# Patient Record
Sex: FEMALE | Race: BLACK OR AFRICAN AMERICAN | Employment: OTHER | URBAN - METROPOLITAN AREA
[De-identification: names, ages, dates, MRNs, and addresses within clinical notes are randomized per-mention and may not be internally consistent; named-entity substitution may affect disease eponyms.]

---

## 2018-02-15 ENCOUNTER — OFFICE VISIT (OUTPATIENT)
Dept: PHYSICAL MEDICINE AND REHAB | Age: 73
End: 2018-02-15

## 2018-02-15 DIAGNOSIS — M79.605 PAIN IN BOTH LOWER EXTREMITIES: ICD-10-CM

## 2018-02-15 DIAGNOSIS — M54.17 LUMBOSACRAL RADICULOPATHY AT S1: Primary | ICD-10-CM

## 2018-02-15 DIAGNOSIS — R20.2 PARESTHESIA OF BILATERAL LEGS: ICD-10-CM

## 2018-02-15 DIAGNOSIS — M79.604 PAIN IN BOTH LOWER EXTREMITIES: ICD-10-CM

## 2018-02-15 PROCEDURE — 95910 NRV CNDJ TEST 7-8 STUDIES: CPT | Performed by: PHYSICAL MEDICINE & REHABILITATION

## 2018-02-15 PROCEDURE — 95886 MUSC TEST DONE W/N TEST COMP: CPT | Performed by: PHYSICAL MEDICINE & REHABILITATION

## 2018-02-15 NOTE — PROGRESS NOTES
Gastroc Normal None \" \" \" \"   EDB Increased + Dec #, Larger \" \" \"   1st dors int \" \" Polys \": \" Polys       FINDINGS:   EMG of the lumbar paraspinals and the LE's demonstrated significant paraspinal and limb muscle membrane irritability. Positive waves and fibrillations with neuropathic motor units were identified in the L>R S1 myotomes. The tibial H reflexes were missing. Otherwise, sensory and motor latencies, evoked amplitudes and conduction velocities were normal.      IMPRESSION:      1. Abnormal EMG. There is distinct electrical evidence of active (acute) spinal nerve root injury (acute L>R S1 radiculopathies). The electrical changes noted today certainly reflect more than \"post op changes\" that can be present for years after spinal surgery. 2. No evidence of a concurrent lumbar plexopathy, peripheral neuropathy or primary muscle disease.           Thank you for this interesting referral.

## 2021-09-20 ENCOUNTER — HOSPITAL ENCOUNTER (OUTPATIENT)
Age: 76
Discharge: HOME | End: 2021-09-20
Payer: MEDICARE

## 2021-09-20 LAB
ANION GAP SERPL CALCULATED.3IONS-SCNC: 11 MMOL/L (ref 4–16)
BUN BLDV-MCNC: 32 MG/DL (ref 6–23)
CALCIUM SERPL-MCNC: 9.3 MG/DL (ref 8.3–10.6)
CHLORIDE BLD-SCNC: 102 MMOL/L (ref 99–110)
CO2: 31 MMOL/L (ref 21–32)
CREAT SERPL-MCNC: 1 MG/DL (ref 0.6–1.1)
GFR AFRICAN AMERICAN: >60 ML/MIN/1.73M2
GFR NON-AFRICAN AMERICAN: 54 ML/MIN/1.73M2
GLUCOSE BLD-MCNC: 91 MG/DL (ref 70–99)
POTASSIUM SERPL-SCNC: 3.8 MMOL/L (ref 3.5–5.1)
SODIUM BLD-SCNC: 144 MMOL/L (ref 135–145)

## 2021-09-20 PROCEDURE — 36415 COLL VENOUS BLD VENIPUNCTURE: CPT

## 2021-09-20 PROCEDURE — 80048 BASIC METABOLIC PNL TOTAL CA: CPT

## 2021-11-08 ENCOUNTER — HOSPITAL ENCOUNTER (OUTPATIENT)
Dept: LAB | Age: 76
Discharge: HOME | End: 2021-11-08
Payer: MEDICARE

## 2021-11-08 LAB
ANION GAP SERPL CALCULATED.3IONS-SCNC: 11 MMOL/L (ref 4–16)
APTT: 33.3 SECONDS (ref 25.1–37.1)
BASOPHILS ABSOLUTE: 0 K/CU MM
BASOPHILS RELATIVE PERCENT: 0.4 % (ref 0–1)
BUN BLDV-MCNC: 30 MG/DL (ref 6–23)
CALCIUM SERPL-MCNC: 9.7 MG/DL (ref 8.3–10.6)
CHLORIDE BLD-SCNC: 102 MMOL/L (ref 99–110)
CO2: 30 MMOL/L (ref 21–32)
CREAT SERPL-MCNC: 1.3 MG/DL (ref 0.6–1.1)
DIFFERENTIAL TYPE: ABNORMAL
EOSINOPHILS ABSOLUTE: 0.1 K/CU MM
EOSINOPHILS RELATIVE PERCENT: 0.5 % (ref 0–3)
GFR AFRICAN AMERICAN: 48 ML/MIN/1.73M2
GFR NON-AFRICAN AMERICAN: 40 ML/MIN/1.73M2
GLUCOSE BLD-MCNC: 93 MG/DL (ref 70–99)
HCT VFR BLD CALC: 42.3 % (ref 37–47)
HEMOGLOBIN: 13.5 GM/DL (ref 12.5–16)
IMMATURE NEUTROPHIL %: 0.4 % (ref 0–0.43)
INR BLD: 1.08 INDEX
LYMPHOCYTES ABSOLUTE: 2.5 K/CU MM
LYMPHOCYTES RELATIVE PERCENT: 21.7 % (ref 24–44)
MCH RBC QN AUTO: 31.6 PG (ref 27–31)
MCHC RBC AUTO-ENTMCNC: 31.9 % (ref 32–36)
MCV RBC AUTO: 99.1 FL (ref 78–100)
MONOCYTES ABSOLUTE: 0.6 K/CU MM
MONOCYTES RELATIVE PERCENT: 5.3 % (ref 0–4)
NUCLEATED RBC %: 0 %
PDW BLD-RTO: 12.7 % (ref 11.7–14.9)
PLATELET # BLD: 355 K/CU MM (ref 140–440)
PMV BLD AUTO: 10.1 FL (ref 7.5–11.1)
POTASSIUM SERPL-SCNC: 3.6 MMOL/L (ref 3.5–5.1)
PROTHROMBIN TIME: 14 SECONDS (ref 11.7–14.5)
RBC # BLD: 4.27 M/CU MM (ref 4.2–5.4)
SEGMENTED NEUTROPHILS ABSOLUTE COUNT: 8.2 K/CU MM
SEGMENTED NEUTROPHILS RELATIVE PERCENT: 71.7 % (ref 36–66)
SODIUM BLD-SCNC: 143 MMOL/L (ref 135–145)
TOTAL IMMATURE NEUTOROPHIL: 0.05 K/CU MM
TOTAL NUCLEATED RBC: 0 K/CU MM
WBC # BLD: 11.4 K/CU MM (ref 4–10.5)

## 2021-11-08 PROCEDURE — 36415 COLL VENOUS BLD VENIPUNCTURE: CPT

## 2021-11-08 PROCEDURE — U0003 INFECTIOUS AGENT DETECTION BY NUCLEIC ACID (DNA OR RNA); SEVERE ACUTE RESPIRATORY SYNDROME CORONAVIRUS 2 (SARS-COV-2) (CORONAVIRUS DISEASE [COVID-19]), AMPLIFIED PROBE TECHNIQUE, MAKING USE OF HIGH THROUGHPUT TECHNOLOGIES AS DESCRIBED BY CMS-2020-01-R: HCPCS

## 2021-11-08 PROCEDURE — U0005 INFEC AGEN DETEC AMPLI PROBE: HCPCS

## 2021-11-08 PROCEDURE — 85730 THROMBOPLASTIN TIME PARTIAL: CPT

## 2021-11-08 PROCEDURE — 80048 BASIC METABOLIC PNL TOTAL CA: CPT

## 2021-11-08 PROCEDURE — 85610 PROTHROMBIN TIME: CPT

## 2021-11-08 PROCEDURE — 85025 COMPLETE CBC W/AUTO DIFF WBC: CPT

## 2021-11-09 LAB
SARS-COV-2: NOT DETECTED
SOURCE: NORMAL

## 2021-11-12 ENCOUNTER — OFFICE VISIT (OUTPATIENT)
Dept: PHYSICAL MEDICINE AND REHAB | Age: 76
End: 2021-11-12
Payer: MEDICARE

## 2021-11-12 VITALS — TEMPERATURE: 97.3 F

## 2021-11-12 DIAGNOSIS — G56.03 BILATERAL CARPAL TUNNEL SYNDROME: Primary | ICD-10-CM

## 2021-11-12 DIAGNOSIS — M54.12 C7 RADICULOPATHY: ICD-10-CM

## 2021-11-12 DIAGNOSIS — M79.602 PARESTHESIA AND PAIN OF BOTH UPPER EXTREMITIES: ICD-10-CM

## 2021-11-12 DIAGNOSIS — M79.601 PARESTHESIA AND PAIN OF BOTH UPPER EXTREMITIES: ICD-10-CM

## 2021-11-12 DIAGNOSIS — G56.21 ULNAR NEUROPATHY AT ELBOW, RIGHT: ICD-10-CM

## 2021-11-12 DIAGNOSIS — R20.2 PARESTHESIA AND PAIN OF BOTH UPPER EXTREMITIES: ICD-10-CM

## 2021-11-12 PROCEDURE — 95886 MUSC TEST DONE W/N TEST COMP: CPT | Performed by: PHYSICAL MEDICINE & REHABILITATION

## 2021-11-12 PROCEDURE — 95911 NRV CNDJ TEST 9-10 STUDIES: CPT | Performed by: PHYSICAL MEDICINE & REHABILITATION

## 2021-11-12 NOTE — PROGRESS NOTES
EMG REPORT     CHIEF COMPLAINT: Right arm and hand pain with digit tingling. HISTORY OF PRESENT ILLNESS: 64 y.o. right hand dominant female with progressive numbness, tingling and pain involving the right more than left arms and hands for several months. She has had chronic spinal and limb pains for years. In fact, she has significant leg pain and tingling today. She reports mild neck pain with stiffness but limited radiation of the pain towards her shoulder. She has numbness and tingling in the digits of the right more than left hand. At times it is only her pinky finger. At other times all 5 digits tingle and burn. She rated the pain severity as 5/10. The symptoms do interrupt her sleep. She has not been dropping things. She did not report upper limb cramping, rashes or focal swelling. She has no history of diabetes or any thyroid disorder. PHYSICAL EXAMINATION: Alert. About 60 degrees of active cervical spine rotation bilaterally. Spurling's maneuver was negative. Upper limb reflexes were trace and symmetric. Tinel's sign was negative. Thumb opposition MMT was 4/5 on the right and 4+/5 on the left. Right elbow extension strength was 4 -/5. All other strength seemed normal.  There was no atrophy, tremor or clonus present. Sensation was distorted in the thumb and all fingers of the right hand. NERVE CONDUCTION STUDIES:     MOTOR         LATENCY NORMAL AMPLITUDE DISTANCE COND. DURAN. RIGHT  MEDIAN 5.9 < 4.2 msec 3.2 8 cm 47   LEFT  MEDIAN 5.9 < 4.2 msec 5.2 8 cm 49   RIGHT  ULNAR 2.9 < 4.2 msec 6.4/4.4 8 cm 61/32   LEFT  ULNAR 3.1 < 4.2 msec 48 8 cm >50      SENSORY  ORTHODROMIC        LATENCY NORMAL AMPLITUDE DISTANCE   RIGHT MEDIAN 3.7 <2.3 msec 13 10 cm   LEFT  MEDIAN 3.7 < 2.3 msec 7 10 cm   RIGHT  ULNAR 2.1 < 2.3 msec 7 10 cm   LEFT  ULNAR 2.2 < 2.3 msec 8 10 cm       Right dorsal ulnar sensory: Absent.         NEEDLE EMG:      RIGHT   LEFT     Insertional Activity Spontaneous  Activity Volutional  MUAP's Insertional Activity Spontaneous  Activity Volutional  MUAP's   Cerv Parasp Guarding None Normal Guarding None Normal   Infraspinatus Normal None Normal Normal None Normal   Deltoid   Normal None Normal Normal None Normal   Biceps   Normal None Normal Normal None Normal   Triceps   Normal None Dec #, Larger Normal None Normal   Pronator Teres Normal None Dec #, Larger Normal None Normal   Extensor Indicis Normal None Dec #, Larger Normal None Normal   1st Dorsal Interosseus Normal None Dec #, Larger, Polys Normal None Normal   ADM Normal None Dec #, Larger, Polys Normal None Normal   Abductor Pollicis Br. Increased ++ Dec #, Larger, Polys Normal None Dec #, Polys         FINDINGS:    EMG of the cervical paraspinals and both upper limbs demonstrated muscle guarding of the paraspinal region. Prevalent muscle membrane irritability was limited to the right APB muscle. A diminished number of larger and polyphasic motor units were seen in the right hand intrinsic muscles. Diminished number of larger motor units were seen throughout the right C7 myotome. Some motor unit recruitment compromise was seen in the left APB muscle. Median sensory and motor latencies were delayed across each wrist.  The right median motor evoked amplitude was diminished. The right median motor forearm conduction velocity was slowed. Ulnar sensory responses were normal but the right dorsal ulnar sensory response was missing. There was significant right ulnar motor conduction slowing with the evoked amplitude loss with stimulation around the elbow segment. The left ulnar nerve conductions were well maintained. IMPRESSION:      1.  Multiple abnormalities were noted today. 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      2.  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 3.  Remote/past right C7 spinal nerve root injury (remote/past right C7 radiculopathy). 4.  No evidence of a concurrent active radiculopathy, plexopathy, generalized neuropathy or primary muscle disease.        Thank you for this interesting referral.

## 2021-11-15 ENCOUNTER — APPOINTMENT (OUTPATIENT)
Dept: CT IMAGING | Age: 76
End: 2021-11-15
Attending: INTERNAL MEDICINE
Payer: MEDICARE

## 2021-11-15 ENCOUNTER — HOSPITAL ENCOUNTER (OUTPATIENT)
Dept: CARDIAC CATH/INVASIVE PROCEDURES | Age: 76
Discharge: HOME | End: 2021-11-15
Attending: INTERNAL MEDICINE | Admitting: INTERNAL MEDICINE
Payer: MEDICARE

## 2021-11-15 VITALS
HEIGHT: 63 IN | SYSTOLIC BLOOD PRESSURE: 113 MMHG | WEIGHT: 195 LBS | TEMPERATURE: 96.2 F | HEART RATE: 59 BPM | OXYGEN SATURATION: 100 % | DIASTOLIC BLOOD PRESSURE: 68 MMHG | BODY MASS INDEX: 34.55 KG/M2 | RESPIRATION RATE: 17 BRPM

## 2021-11-15 LAB
ACTIVATED CLOTTING TIME, LOW RANGE: 195 SEC
ALBUMIN SERPL-MCNC: 3.4 GM/DL (ref 3.4–5)
ALP BLD-CCNC: 44 IU/L (ref 40–129)
ALT SERPL-CCNC: 16 U/L (ref 10–40)
ANION GAP SERPL CALCULATED.3IONS-SCNC: 10 MMOL/L (ref 4–16)
AST SERPL-CCNC: 14 IU/L (ref 15–37)
BASE EXCESS MIXED: 3.4 (ref 0–2.3)
BILIRUB SERPL-MCNC: 0.5 MG/DL (ref 0–1)
BILIRUBIN DIRECT: 0.2 MG/DL (ref 0–0.3)
BILIRUBIN, INDIRECT: 0.3 MG/DL (ref 0–0.7)
BUN BLDV-MCNC: 30 MG/DL (ref 6–23)
CALCIUM SERPL-MCNC: 9.3 MG/DL (ref 8.3–10.6)
CARBON MONOXIDE, BLOOD: 1.9 % (ref 0–5)
CHLORIDE BLD-SCNC: 101 MMOL/L (ref 99–110)
CHOLESTEROL: 168 MG/DL
CO2 CONTENT: 29.9 MMOL/L (ref 19–24)
CO2: 25 MMOL/L (ref 21–32)
COMMENT: ABNORMAL
CREAT SERPL-MCNC: 1.4 MG/DL (ref 0.6–1.1)
D DIMER: <200 NG/ML(DDU)
GFR AFRICAN AMERICAN: 44 ML/MIN/1.73M2
GFR NON-AFRICAN AMERICAN: 36 ML/MIN/1.73M2
GLUCOSE BLD-MCNC: 108 MG/DL (ref 70–99)
HCO3 ARTERIAL: 28.5 MMOL/L (ref 18–23)
HDLC SERPL-MCNC: 62 MG/DL
LDL CHOLESTEROL DIRECT: 106 MG/DL
METHEMOGLOBIN ARTERIAL: 1.2 %
O2 SATURATION: 92.5 % (ref 96–97)
PCO2 ARTERIAL: 44 MMHG (ref 32–45)
PH BLOOD: 7.42 (ref 7.34–7.45)
PO2 ARTERIAL: 63 MMHG (ref 75–100)
POTASSIUM SERPL-SCNC: 3.7 MMOL/L (ref 3.5–5.1)
SODIUM BLD-SCNC: 136 MMOL/L (ref 135–145)
T4 FREE: 0.93 NG/DL (ref 0.9–1.8)
TOTAL PROTEIN: 5.5 GM/DL (ref 6.4–8.2)
TRIGL SERPL-MCNC: 59 MG/DL
TSH HIGH SENSITIVITY: 1.95 UIU/ML (ref 0.27–4.2)

## 2021-11-15 PROCEDURE — 84443 ASSAY THYROID STIM HORMONE: CPT

## 2021-11-15 PROCEDURE — 83721 ASSAY OF BLOOD LIPOPROTEIN: CPT

## 2021-11-15 PROCEDURE — 80061 LIPID PANEL: CPT

## 2021-11-15 PROCEDURE — 6360000002 HC RX W HCPCS

## 2021-11-15 PROCEDURE — 71250 CT THORAX DX C-: CPT

## 2021-11-15 PROCEDURE — 84439 ASSAY OF FREE THYROXINE: CPT

## 2021-11-15 PROCEDURE — 2500000003 HC RX 250 WO HCPCS

## 2021-11-15 PROCEDURE — 2580000003 HC RX 258: Performed by: INTERNAL MEDICINE

## 2021-11-15 PROCEDURE — C1894 INTRO/SHEATH, NON-LASER: HCPCS

## 2021-11-15 PROCEDURE — 2709999900 HC NON-CHARGEABLE SUPPLY

## 2021-11-15 PROCEDURE — 80076 HEPATIC FUNCTION PANEL: CPT

## 2021-11-15 PROCEDURE — 80048 BASIC METABOLIC PNL TOTAL CA: CPT

## 2021-11-15 PROCEDURE — C1769 GUIDE WIRE: HCPCS

## 2021-11-15 PROCEDURE — 6370000000 HC RX 637 (ALT 250 FOR IP): Performed by: INTERNAL MEDICINE

## 2021-11-15 PROCEDURE — 85347 COAGULATION TIME ACTIVATED: CPT

## 2021-11-15 PROCEDURE — C1887 CATHETER, GUIDING: HCPCS

## 2021-11-15 PROCEDURE — 6360000004 HC RX CONTRAST MEDICATION

## 2021-11-15 PROCEDURE — 93458 L HRT ARTERY/VENTRICLE ANGIO: CPT

## 2021-11-15 PROCEDURE — 85379 FIBRIN DEGRADATION QUANT: CPT

## 2021-11-15 PROCEDURE — 82803 BLOOD GASES ANY COMBINATION: CPT

## 2021-11-15 RX ORDER — SODIUM CHLORIDE 9 MG/ML
INJECTION, SOLUTION INTRAVENOUS CONTINUOUS
Status: DISCONTINUED | OUTPATIENT
Start: 2021-11-15 | End: 2021-11-15 | Stop reason: HOSPADM

## 2021-11-15 RX ORDER — IBUPROFEN 200 MG
1 CAPSULE ORAL DAILY
COMMUNITY
End: 2021-12-14

## 2021-11-15 RX ORDER — POTASSIUM CHLORIDE 750 MG/1
10 CAPSULE, EXTENDED RELEASE ORAL DAILY
COMMUNITY

## 2021-11-15 RX ORDER — SODIUM CHLORIDE 9 MG/ML
25 INJECTION, SOLUTION INTRAVENOUS PRN
Status: DISCONTINUED | OUTPATIENT
Start: 2021-11-15 | End: 2021-11-15 | Stop reason: HOSPADM

## 2021-11-15 RX ORDER — DIAZEPAM 5 MG/1
5 TABLET ORAL ONCE
Status: COMPLETED | OUTPATIENT
Start: 2021-11-15 | End: 2021-11-15

## 2021-11-15 RX ORDER — SOY PROTEIN
POWDER (GRAM) ORAL DAILY
COMMUNITY
End: 2021-12-14

## 2021-11-15 RX ORDER — SODIUM CHLORIDE 0.9 % (FLUSH) 0.9 %
5-40 SYRINGE (ML) INJECTION EVERY 12 HOURS SCHEDULED
Status: DISCONTINUED | OUTPATIENT
Start: 2021-11-15 | End: 2021-11-15 | Stop reason: HOSPADM

## 2021-11-15 RX ORDER — SODIUM CHLORIDE 0.9 % (FLUSH) 0.9 %
5-40 SYRINGE (ML) INJECTION PRN
Status: DISCONTINUED | OUTPATIENT
Start: 2021-11-15 | End: 2021-11-15 | Stop reason: HOSPADM

## 2021-11-15 RX ORDER — ATROPINE SULFATE 0.1 MG/ML
0.5 INJECTION INTRAVENOUS
Status: DISCONTINUED | OUTPATIENT
Start: 2021-11-15 | End: 2021-11-15 | Stop reason: HOSPADM

## 2021-11-15 RX ORDER — SUMATRIPTAN 25 MG/1
25 TABLET, FILM COATED ORAL
COMMUNITY

## 2021-11-15 RX ORDER — MORPHINE SULFATE 2 MG/ML
1 INJECTION, SOLUTION INTRAMUSCULAR; INTRAVENOUS
Status: DISCONTINUED | OUTPATIENT
Start: 2021-11-15 | End: 2021-11-15 | Stop reason: HOSPADM

## 2021-11-15 RX ORDER — TRAVOPROST OPHTHALMIC SOLUTION 0.04 MG/ML
1 SOLUTION OPHTHALMIC NIGHTLY
COMMUNITY

## 2021-11-15 RX ORDER — ESCITALOPRAM OXALATE 20 MG/1
10 TABLET ORAL DAILY
COMMUNITY

## 2021-11-15 RX ORDER — FLUTICASONE PROPIONATE 50 MCG
1 SPRAY, SUSPENSION (ML) NASAL PRN
COMMUNITY

## 2021-11-15 RX ORDER — OMEPRAZOLE 20 MG/1
20 CAPSULE, DELAYED RELEASE ORAL DAILY
COMMUNITY
End: 2021-12-14

## 2021-11-15 RX ORDER — ATROPINE SULFATE 0.4 MG/ML
0.5 AMPUL (ML) INJECTION
Status: DISCONTINUED | OUTPATIENT
Start: 2021-11-15 | End: 2021-11-15

## 2021-11-15 RX ORDER — DIPHENHYDRAMINE HCL 25 MG
25 TABLET ORAL ONCE
Status: COMPLETED | OUTPATIENT
Start: 2021-11-15 | End: 2021-11-15

## 2021-11-15 RX ORDER — ACETAMINOPHEN 325 MG/1
650 TABLET ORAL EVERY 4 HOURS PRN
Status: DISCONTINUED | OUTPATIENT
Start: 2021-11-15 | End: 2021-11-15 | Stop reason: HOSPADM

## 2021-11-15 RX ORDER — TOPIRAMATE 25 MG/1
25 TABLET ORAL 2 TIMES DAILY
COMMUNITY

## 2021-11-15 RX ORDER — FUROSEMIDE 20 MG/1
20 TABLET ORAL DAILY
COMMUNITY

## 2021-11-15 RX ADMIN — DIPHENHYDRAMINE HCL 25 MG: 25 TABLET ORAL at 08:14

## 2021-11-15 RX ADMIN — SODIUM CHLORIDE: 9 INJECTION, SOLUTION INTRAVENOUS at 08:13

## 2021-11-15 RX ADMIN — DIAZEPAM 5 MG: 5 TABLET ORAL at 08:14

## 2021-11-15 NOTE — OP NOTE
Operative Note      Patient:  Margret Garcia  YOB: 1945  MRN: [id number]    Date of Procedure: 11/15/21    Pre-Op Diagnosis: chest pain     Post-Op Diagnosis: Same       Electronically signed by Rebecca Paige MD on 11/15/2021 at 9:05 AM      DICTATED --[id number]  LEFT MAIN PATENT  LAD MILD DX  LCX MILD DX  RCA MILD DX  LVEDP 15  MEDICAL TREATMENT  CHECK CT CHEST   CHECK LABS  HOME LATER TODAY  RIGHT RADIAL APPROACH    Electronically signed by Rebecca Paige MD on 11/15/21 at 9:11 AM EST

## 2021-11-15 NOTE — PROGRESS NOTES
Discharge instructions reviewed. Questions answered and patient verbalized understanding. PIV removed. Radial Site WNL.  Patient transported to CT scan per order prior to discharge

## 2021-11-15 NOTE — H&P
[address], [address] [place] [address]                              HISTORY AND PHYSICAL    PATIENT NAME: Gregory Gore                     :        1945  MED REC NO:   [id number]                          ROOM:  ACCOUNT NO:   [de-identified]                           ADMIT DATE: 11/15/2021  PROVIDER:     Miguelangel Thibodeaux MD    INDICATION:  Chest pain and shortness of breath. HISTORY OF PRESENT ILLNESS:  This is a 75-year-old female patient,  patient of Dr. Javi Vences. Getting progressive short of breath. Chest pain  with exertion present. Relieved by resting. It is getting worse in the  last six months to one year. She has never had a heart cath. She does have a history of hypertension. She is on medications for  that. Questionable history of having sleep apnea present. PAST MEDICAL HISTORY:  Proximal atrial fibrillation, hypertension,  hyperlipidemia, asthma, depression, bronchitis, migraine headache,  osteoporosis, and vertigo. PAST SURGERIES:  Appendectomy, back surgery, hysterectomy, left knee  surgery, right knee surgery, and tonsillectomy. SOCIAL HISTORY:  She was does not smoke and does not drink. She is a  retired . ALLERGIES:  Her allergies are NKDA. MEDICATIONS:  She is on inhalers, calcium, multivitamins, Diovan,  Eliquis 5 b.i.d., Lasix, Imitrex, Lexapro, meclizine. See the rest of  the list.    PHYSICAL EXAMINATION:  GENERAL:  The patient is awake, alert, answering questions, not in acute  distress. VITAL SIGNS:  Temperature is afebrile, pulse is 75, blood pressure is  194/68. HEENT:  Head is normocephalic and atraumatic. Pupils are equal and  reactive to light. CHEST:  Equal expansion. LUNGS:  Clear to auscultation. No wheezing or rhonchi. HEART:  Regular rhythm. ABDOMEN:  Soft, nontender. Bowel sounds are present. No  hepatosplenomegaly or guarding appreciated.   EXTREMITIES: No cyanosis noted. NEUROLOGIC:  Cranial nerves II through XII are grossly intact. DIAGNOSTIC DATA:  Echo in 11/2020, LV function was preserved. Her  stress test was also negative for ischemia. IMPRESSION:  This is a 75-year-old female patient getting progressive  short of breath and acute bowel ischemia cannot be excluded. PLAN:  In light of her ongoing symptoms, plan is for right and left  heart catheterization, and we will make further recommendations based on  that.         Jazz Jean MD    D: 11/15/2021 8:13:39       T: 11/15/2021 8:16:08     [email or web address]  Job#: [id number]     Doc#: [id number]    CC:

## 2021-11-15 NOTE — PROCEDURES
[address] [place] [address], [address] [place] [address]                            CARDIAC CATHETERIZATION    PATIENT NAME: Lila Almaraz                     :        1945  MED REC NO:   [id number]                          ROOM:  ACCOUNT NO:   [de-identified]                           ADMIT DATE: 11/15/2021  PROVIDER:     Tea Walsh MD    DATE OF PROCEDURE:  11/15/2021    INDICATION:  Chest pain and shortness of breath. This is a 66-year female patient brought to cath lab today. Informed  consent was obtained from the patient. The patient was prepped and  draped in a sterile fashion. The patient was injected with 5 mL of 2%  lidocaine in the right radial region. Using a radial needle, the right  radial artery was cannulized and a 5/6-Malagasy sheath was placed in the  right radial artery. The entire procedure was done using a guidewire,  and sheath was flushed in between the procedures. Using a TIG catheter, left coronary angiography was performed. Left  coronary angiogram revealed the left main is patent. It bifurcates into  LAD and circumflex artery. Circ is a medium-sized vessel, gives off a large OM branch. Circ has a  mild disease noted. OM has a mild disease noted. LAD is a medium-sized vessel. It reaches and wraps the apex. It gives  off a small diagonal branch. LAD has mild disease present. CLARIBEL-3 flow  is noted. Using an AR-MOD catheter, right coronary angiography was performed. It  is slightly nonspecific injection. Right coronary artery is a  medium-sized vessel, dominant vessel and no obstructive lesions are  noted. CLARIBEL-3 flow is noted. Using a pigtail catheter, the EDP was measured. EDP was around 15 mmHg  present. On the pullback, there was no gradient present across the  aortic valve. IMPRESSION:  1. Left main is patent. 2.  LAD, circ, and RCA have all mild disease noted.   3.  EDP was around 15 mmHg present. 4.  The patient has nonobstructive coronary artery disease present. Continue medical treatment. The patient tolerated the procedure well  with no complication noted. I will get a CT chest.  It is without contrast as her creatinine is  slightly elevated, GFR is low. D-dimer is also checked.       Blood loss 20cc    Barabara Gaucher, MD    D: 11/15/2021 9:09:06       T: 11/15/2021 9:11:40     [email or web address]  Job#: [id number]     Doc#: [id number]    CC: Fair